# Patient Record
Sex: MALE | Race: BLACK OR AFRICAN AMERICAN
[De-identification: names, ages, dates, MRNs, and addresses within clinical notes are randomized per-mention and may not be internally consistent; named-entity substitution may affect disease eponyms.]

---

## 2019-06-30 ENCOUNTER — HOSPITAL ENCOUNTER (EMERGENCY)
Dept: HOSPITAL 74 - JERFT | Age: 19
Discharge: HOME | End: 2019-06-30
Payer: COMMERCIAL

## 2019-06-30 VITALS — HEART RATE: 88 BPM | DIASTOLIC BLOOD PRESSURE: 92 MMHG | TEMPERATURE: 98.1 F | SYSTOLIC BLOOD PRESSURE: 132 MMHG

## 2019-06-30 VITALS — BODY MASS INDEX: 25.8 KG/M2

## 2019-06-30 DIAGNOSIS — J06.9: Primary | ICD-10-CM

## 2019-06-30 DIAGNOSIS — B97.89: ICD-10-CM

## 2019-06-30 NOTE — PDOC
History of Present Illness





- General


Chief Complaint: Sore Throat


Stated Complaint: SORE THORAT


Time Seen by Provider: 06/30/19 09:50


History Source: Patient


Exam Limitations: No Limitations





Past History





- Past Medical History


Allergies/Adverse Reactions: 


 Allergies











Allergy/AdvReac Type Severity Reaction Status Date / Time


 


No Known Allergies Allergy   Verified 06/30/19 09:50











Home Medications: 


Ambulatory Orders





NK [No Known Home Medication]  06/30/19 








COPD: No





- Suicide/Smoking/Psychosocial Hx


Smoking History: Never smoked





*Physical Exam





- Vital Signs


 Last Vital Signs











Temp Pulse Resp BP Pulse Ox


 


 98.1 F   88   18   132/92   98 


 


 06/30/19 09:48  06/30/19 09:48  06/30/19 09:48  06/30/19 09:48  06/30/19 09:48














- Physical Exam


General Appearance: No: Apparent Distress


HEENT: positive: Normal Voice, Nasal Congestion (mild L nare).  negative: 

Muffled/Hoarse voice, Pharyngeal Erythema, Tonsillar Exudate, Tonsillar Erythema

, Rhinorrhea, Sinus Tenderness


Respiratory/Chest: positive: Lungs Clear, Normal Breath Sounds.  negative: 

Respiratory Distress


Cardiovascular: positive: Regular Rhythm, Regular Rate, S1, S2.  negative: 

Murmur


Gastrointestinal/Abdominal: positive: Normal Bowel Sounds, Soft.  negative: 

Tender, Distended, Guarding, Rebound


Integumentary: positive: Normal Color


Neurologic: positive: Alert, Normal Mood/Affect





Medical Decision Making





- Medical Decision Making








18 y/o M with hx of scoliosis presents with throat irritation x 3days which can 

lead to mild dry cough. Mentions had subjective fever 3 days ago but nothing 

since then. Denies ear pain, rhinorrhea, congestion, postnasal drip, sneezing, 

sob, cp, abd pain, n/v/d.





Likely viral URI


Supportive care discussed





06/30/19 10:04








*DC/Admit/Observation/Transfer


Diagnosis at time of Disposition: 


 Viral URI








- Discharge Dispostion


Disposition: HOME


Condition at time of disposition: Stable


Decision to Admit order: No





- Referrals





- Patient Instructions


Printed Discharge Instructions:  DI for Viral Upper Respiratory Infection -- 

Adult


Additional Instructions: 





Thank you for choosing [facility].  It was a pleasure taking 

care of you.  





Recommend using a Nedi-Pot or saline nasal spray to help with your congestion


Also recommend salt water gargles to help with throat irritation


Follow-up with your doctor in 2 days





Return to the Emergency Department if your symptoms worsen or persist or have 

other concerning symptoms. 





- Post Discharge Activity